# Patient Record
Sex: MALE | Race: WHITE | Employment: FULL TIME | URBAN - METROPOLITAN AREA
[De-identification: names, ages, dates, MRNs, and addresses within clinical notes are randomized per-mention and may not be internally consistent; named-entity substitution may affect disease eponyms.]

---

## 2022-03-31 PROBLEM — M47.816 LUMBAR SPONDYLOSIS: Status: ACTIVE | Noted: 2022-03-31

## 2022-03-31 PROBLEM — M54.50 LUMBAR BACK PAIN: Status: ACTIVE | Noted: 2022-03-31

## 2022-07-01 RX ORDER — DICLOFENAC SODIUM 75 MG/1
75 TABLET, DELAYED RELEASE ORAL 2 TIMES DAILY WITH MEALS
Qty: 60 TABLET | Refills: 1 | Status: SHIPPED | OUTPATIENT
Start: 2022-07-01

## 2022-07-21 ENCOUNTER — OFFICE VISIT (OUTPATIENT)
Dept: ORTHOPEDIC SURGERY | Age: 52
End: 2022-07-21
Payer: COMMERCIAL

## 2022-07-21 DIAGNOSIS — M54.17 LUMBOSACRAL RADICULOPATHY: Primary | ICD-10-CM

## 2022-07-21 DIAGNOSIS — M54.6 THORACIC SPINE PAIN: ICD-10-CM

## 2022-07-21 PROCEDURE — 20552 NJX 1/MLT TRIGGER POINT 1/2: CPT | Performed by: PHYSICAL MEDICINE & REHABILITATION

## 2022-07-21 PROCEDURE — 64483 NJX AA&/STRD TFRM EPI L/S 1: CPT | Performed by: PHYSICAL MEDICINE & REHABILITATION

## 2022-07-21 RX ORDER — TRIAMCINOLONE ACETONIDE 40 MG/ML
280 INJECTION, SUSPENSION INTRA-ARTICULAR; INTRAMUSCULAR ONCE
Status: COMPLETED | OUTPATIENT
Start: 2022-07-21 | End: 2022-07-21

## 2022-07-21 RX ADMIN — TRIAMCINOLONE ACETONIDE 280 MG: 40 INJECTION, SUSPENSION INTRA-ARTICULAR; INTRAMUSCULAR at 16:11

## 2022-07-21 NOTE — PROGRESS NOTES
Date: 07/21/22   Name: Hayde Perales    Pre-Procedural Diagnosis:    Diagnosis Orders   1. Lumbosacral radiculopathy  FL NERVE BLOCK LUMBOSACRAL 1ST    FL NERVE BLOCK LUMBOSACRAL EACH ADD    triamcinolone acetonide (KENALOG-40) injection 280 mg      2. Thoracic spine pain  INJECT TRIGGER POINT, 1 OR 2    triamcinolone acetonide (KENALOG-40) injection 280 mg          Procedure: Selective Nerve Root Blocks (Transforaminal) - Multiple Level with trigger point injections    Precautions: [facility] Precautions spine injections: None. Patient denies any prior sensitivity to steroid, local anesthetic, contrast dye, iodine or shellfish. The procedure was discussed at length with the patient and informed consent was signed. The patient was placed in a prone position on the fluoroscopy table and the skin was prepped and draped in a routine sterile fashion. The areas to be injected were each anesthetized with approximately 5 cc of 1% Lidocaine. A 22-gauge 5 inch inch spinal needle was carefully advanced under fluoroscopic guidance to the left S1 transforaminal space and subsequently the right L5 transforaminal space. At this time 0.25 cc of omnipaque administered. . Once proper placement was confirmed, 2 cc of 0.25% Marcaine and 80 mg of Kenalog were injected through the spinal needle at each site. After informed oral consent, patient was prepped per routine. The bilateral upper thoracic paraspinal area(s) were injected. 60 mg of Kenalog with 1 cc of 0.25% Marcaine injected at each site. Patient tolerated well. Band aid(s) applied. Fluoroscopic guidance was used intermittently over a 10-minute period to insure proper needle placement and patient safety. A hard copy of the fluoroscopic  images has been placed in the patient's chart. The patient was monitored after the procedure and discharged home in stable fashion.      Resume Meds:   N/A    Sherine Vigil MD  07/21/22

## 2022-08-10 RX ORDER — CYCLOBENZAPRINE HCL 10 MG
TABLET ORAL
Qty: 90 TABLET | OUTPATIENT
Start: 2022-08-10

## 2022-08-30 RX ORDER — DICLOFENAC SODIUM 75 MG/1
TABLET, DELAYED RELEASE ORAL
Qty: 60 TABLET | Refills: 1 | OUTPATIENT
Start: 2022-08-30

## 2022-10-03 ENCOUNTER — OFFICE VISIT (OUTPATIENT)
Dept: ORTHOPEDIC SURGERY | Age: 52
End: 2022-10-03
Payer: COMMERCIAL

## 2022-10-03 DIAGNOSIS — M54.17 LUMBOSACRAL RADICULOPATHY: Primary | ICD-10-CM

## 2022-10-03 PROCEDURE — 99213 OFFICE O/P EST LOW 20 MIN: CPT | Performed by: PHYSICAL MEDICINE & REHABILITATION

## 2022-10-03 RX ORDER — GABAPENTIN 100 MG/1
100 CAPSULE ORAL 3 TIMES DAILY
Qty: 270 CAPSULE | Refills: 3 | Status: SHIPPED | OUTPATIENT
Start: 2022-10-03 | End: 2023-09-28

## 2022-10-03 RX ORDER — BUPRENORPHINE 15 UG/H
1 PATCH TRANSDERMAL WEEKLY
Qty: 4 PATCH | Refills: 5 | Status: SHIPPED | OUTPATIENT
Start: 2022-10-03 | End: 2023-03-20

## 2022-10-03 RX ORDER — DICLOFENAC SODIUM AND MISOPROSTOL 75; 200 MG/1; UG/1
1 TABLET, DELAYED RELEASE ORAL 2 TIMES DAILY
Qty: 180 TABLET | Refills: 1 | Status: SHIPPED | OUTPATIENT
Start: 2022-10-03

## 2022-10-03 NOTE — PROGRESS NOTES
Date:  10/03/22     HPI:  I am seeing Sary Arevalor in evalution/folowup. He has pain in the lumbar spine that may gravitate down the posterior aspect of the left leg and the lateral aspect of the right leg. Left S1 and right L5 selective nerve root blocks have offered benefit. He typically goes 3-4 months. The last set of injections of this type were about 2-1/2 months ago. He started work with football coaching and tells me that that typically lowers the effectiveness duration a little bit. At one time he was having severe pain across the lower lumbar paraspinal areas and responded to facet joint injections, though that area is doing pretty well right now. He has pain in the upper thoracic paraspinal areas and typically gets injections there but does not need those right now. These were last performed in late July. He has had spine surgical intervention in the past by Dr. Seamus Ward. Currently is not felt that he needs a repeat spine surgical intervention. We will try to get by with minimally invasive therapies. He also takes Butrans patches as well as diclofenac, cyclobenzaprine and gabapentin. The latter medication in the 100 mg 3 times daily is offering some benefit and no side effects    ROS: No new problems since last being seen    PE: Cooperative. Good strength in lower extremities. No lateralizing sensory findings. He ambulates without assistance. He has tenderness in the lower lumbar paraspinal areas and buttocks. I did not examine his thoracic spine today. Assessment/Plan:    1. PREDOMINANTLY MUSCULOSKELETAL LUMBOSACRAL  SPINE PAIN. Has done reasonably well. We will try and avoid repeat spine surgical options. He does well with selective nerve root blocks at the appropriate levels. We will repeat those injections at first availability. We do not need to reimage. Will give refills on the medications that he needs today, this to include Butrans patches.   I will see him back 6 months for continuity of care. We will have him come in for repeat selective nerve root blocks at first availability. 2.  Thoracic spine pain. Repeat trigger point injections when required, does not sound like he needs them today. No indication for imaging.     Michel Dyer MD

## 2022-12-19 DIAGNOSIS — M54.17 LUMBOSACRAL RADICULOPATHY: ICD-10-CM

## 2022-12-27 RX ORDER — GABAPENTIN 100 MG/1
100 CAPSULE ORAL 3 TIMES DAILY
Qty: 270 CAPSULE | Refills: 3 | Status: SHIPPED | OUTPATIENT
Start: 2022-12-27 | End: 2023-12-22

## 2023-02-16 ENCOUNTER — OFFICE VISIT (OUTPATIENT)
Dept: ORTHOPEDIC SURGERY | Age: 53
End: 2023-02-16

## 2023-02-16 DIAGNOSIS — M54.17 LUMBOSACRAL RADICULOPATHY: Primary | ICD-10-CM

## 2023-02-16 DIAGNOSIS — M54.6 THORACIC SPINE PAIN: ICD-10-CM

## 2023-02-16 DIAGNOSIS — M47.816 SPONDYLOSIS WITHOUT MYELOPATHY OR RADICULOPATHY, LUMBAR REGION: ICD-10-CM

## 2023-02-16 DIAGNOSIS — M54.2 CERVICALGIA: ICD-10-CM

## 2023-02-16 RX ORDER — TRIAMCINOLONE ACETONIDE 40 MG/ML
280 INJECTION, SUSPENSION INTRA-ARTICULAR; INTRAMUSCULAR ONCE
Status: COMPLETED | OUTPATIENT
Start: 2023-02-16 | End: 2023-02-16

## 2023-02-16 RX ADMIN — TRIAMCINOLONE ACETONIDE 280 MG: 40 INJECTION, SUSPENSION INTRA-ARTICULAR; INTRAMUSCULAR at 16:19

## 2023-02-16 NOTE — PROGRESS NOTES
Date: 02/16/23   Name: Qiana Mota    Pre-Procedural Diagnosis:    Diagnosis Orders   1. Lumbosacral radiculopathy  FL NERVE BLOCK LUMBOSACRAL 1ST    FL NERVE BLOCK LUMBOSACRAL EACH ADD    triamcinolone acetonide (KENALOG-40) injection 280 mg      2. Spondylosis without myelopathy or radiculopathy, lumbar region  triamcinolone acetonide (KENALOG-40) injection 280 mg      3. Thoracic spine pain  triamcinolone acetonide (KENALOG-40) injection 280 mg    INJECT TRIGGER POINT, 1 OR 2      4. Cervicalgia            Procedure: Selective Nerve Root Blocks (Transforaminal) - Multiple Level with trigger point injections    Precautions: [facility] Precautions spine injections: None. Patient denies any prior sensitivity to steroid, local anesthetic, contrast dye, iodine or shellfish. The procedure was discussed at length with the patient and informed consent was signed. The patient was placed in a prone position on the fluoroscopy table and the skin was prepped and draped in a routine sterile fashion. The areas to be injected were each anesthetized with approximately 5 cc of 1% Lidocaine. A 22-gauge 5 inch inch spinal needle was carefully advanced under fluoroscopic guidance to the left S1 transforaminal space and subsequently the right L5 transforaminal space. At this time 0.25 cc of omnipaque administered. . Once proper placement was confirmed, 2 cc of 0.25% Marcaine and 80 mg of Kenalog were injected through the spinal needle at each site. After informed oral consent, patient was prepped per routine. The bilateral upper thoracic paraspinal area(s) were injected. 60 mg of Kenalog with 1 cc of 0.25% Marcaine injected at each site. Patient tolerated well. Band aid(s) applied. Fluoroscopic guidance was used intermittently over a 10-minute period to insure proper needle placement and patient safety. A hard copy of the fluoroscopic  images has been placed in the patient's chart.  The patient was monitored after the procedure and discharged home in stable fashion. A total of two muscles/sites injected today for trigger point charge.     Resume Meds:   N/A    Maurilio Mcmillan MD  02/16/23

## 2023-03-25 DIAGNOSIS — M54.17 LUMBOSACRAL RADICULOPATHY: ICD-10-CM

## 2023-03-27 RX ORDER — DICLOFENAC SODIUM AND MISOPROSTOL 75; 200 MG/1; UG/1
TABLET, DELAYED RELEASE ORAL
Qty: 180 TABLET | Refills: 1 | OUTPATIENT
Start: 2023-03-27

## 2023-05-15 DIAGNOSIS — M54.17 LUMBOSACRAL RADICULOPATHY: Primary | ICD-10-CM

## 2023-05-16 RX ORDER — DICLOFENAC SODIUM AND MISOPROSTOL 75; 200 MG/1; UG/1
1 TABLET, DELAYED RELEASE ORAL 2 TIMES DAILY
Qty: 60 TABLET | Refills: 0 | Status: SHIPPED | OUTPATIENT
Start: 2023-05-16

## 2023-05-21 DIAGNOSIS — M54.17 LUMBOSACRAL RADICULOPATHY: ICD-10-CM

## 2023-05-22 ENCOUNTER — OFFICE VISIT (OUTPATIENT)
Dept: ORTHOPEDIC SURGERY | Age: 53
End: 2023-05-22
Payer: COMMERCIAL

## 2023-05-22 DIAGNOSIS — M54.17 LUMBOSACRAL RADICULOPATHY: Primary | ICD-10-CM

## 2023-05-22 PROCEDURE — 99213 OFFICE O/P EST LOW 20 MIN: CPT | Performed by: PHYSICAL MEDICINE & REHABILITATION

## 2023-05-22 RX ORDER — BUPRENORPHINE 15 UG/H
PATCH TRANSDERMAL
Qty: 4 PATCH | OUTPATIENT
Start: 2023-05-22

## 2023-05-22 RX ORDER — BUPRENORPHINE 15 UG/H
1 PATCH TRANSDERMAL WEEKLY
Qty: 4 PATCH | Refills: 5 | Status: SHIPPED | OUTPATIENT
Start: 2023-05-22 | End: 2023-06-21

## 2023-05-22 RX ORDER — GABAPENTIN 100 MG/1
100 CAPSULE ORAL 3 TIMES DAILY
Qty: 270 CAPSULE | Refills: 1 | Status: SHIPPED | OUTPATIENT
Start: 2023-05-22 | End: 2023-11-18

## 2023-05-22 RX ORDER — BUPRENORPHINE 15 UG/H
1 PATCH TRANSDERMAL WEEKLY
COMMUNITY
End: 2023-05-22 | Stop reason: SDUPTHER

## 2023-05-22 NOTE — PROGRESS NOTES
Date:  05/22/23     HPI:  I am seeing Sue Santana in evalution/folowup. I last saw him in the office setting in October. He has pain that may gravitate down the posterior aspect of the left leg in the lateral aspect of the right leg. Left S1 and right L5 selective nerve root blocks have offered benefit, these most recently performed about 3 months ago. The pain has come back in a similar fashion he is desiring reinjection. He also has pain across the lower lumbar paraspinal areas that have responded to facet joint injections, though that is doing pretty well right now. He also has pain in the upper thoracic paraspinal areas and gets trigger point injections there, typically in conjunction with the spine injections. He takes Butrans patches with good tolerability. He needs refills on those. He also takes diclofenac/misoprostol and gabapentin. The latter is 100 mg 3 times a day and he feels that there is benefit, has noted that if he misses the medicine or comes off of it he feels worse. He does not want to take a higher dose. He has had prior spine surgical intervention by Dr. Francesca Jackson. Repeat surgery is not required at this time. ROS: In the interim he developed a case of severe food poisoning with colitis but is over that. PE: Alert and cooperative. Good strength in the lower extremities. No lateralizing findings. He has tenderness in lower lumbar paraspinal areas and upper thoracic paraspinal areas. Ambulates without assistance    Assessment/Plan:    1. PREDOMINANTLY MUSCULOSKELETAL LUMBOSACRAL  SPINE PAIN. Bilateral radicular symptoms as per narrative above. We do not need to reimage. I do not think there is a repeat spine surgical issue at this time. We will reorder injections at first availability. Refills on medications. 2.  Upper thoracic paraspinal pain. Repeat trigger point injections.     Hua Landeros MD

## 2023-05-24 ENCOUNTER — OFFICE VISIT (OUTPATIENT)
Dept: ORTHOPEDIC SURGERY | Age: 53
End: 2023-05-24

## 2023-05-24 DIAGNOSIS — M54.6 THORACIC SPINE PAIN: ICD-10-CM

## 2023-05-24 DIAGNOSIS — M54.17 LUMBOSACRAL RADICULOPATHY: Primary | ICD-10-CM

## 2023-05-24 RX ORDER — TRIAMCINOLONE ACETONIDE 40 MG/ML
280 INJECTION, SUSPENSION INTRA-ARTICULAR; INTRAMUSCULAR ONCE
Status: COMPLETED | OUTPATIENT
Start: 2023-05-24 | End: 2023-05-24

## 2023-05-24 RX ADMIN — TRIAMCINOLONE ACETONIDE 280 MG: 40 INJECTION, SUSPENSION INTRA-ARTICULAR; INTRAMUSCULAR at 16:36

## 2023-05-24 NOTE — PROGRESS NOTES
Date: 05/24/23   Name: Jono Guzman    Pre-Procedural Diagnosis:    Diagnosis Orders   1. Lumbosacral radiculopathy  FL NERVE BLOCK LUMBOSACRAL 1ST    FL NERVE BLOCK LUMBOSACRAL EACH ADD      2. Thoracic spine pain  INJECT TRIGGER POINT, 1 OR 2          Procedure: Selective Nerve Root Blocks (Transforaminal) - Single Level with trigger point injections    Precautions: [facility] Precautions spine injections: None. Patient denies any prior sensitivity to steroid, local anesthetic, contrast dye, iodine or shellfish. The procedure was discussed at length with the patient and informed consent was signed. The patient was placed in a prone position on the fluoroscopy table and the skin was prepped and draped in a routine sterile fashion. The areas to be injected was/were anesthetized with approximately 5 cc of 1% Lidocaine. A 22-gauge 5 inch inch spinal needle was carefully advanced under fluoroscopic guidance to the left S1 and right L5 transforaminal space(s). At this time 0.25 cc of omnipaque administered. Once proper placement was confirmed, 2 cc of 0.25% Marcaine and 80 mg of Kenalog were injected through the spinal needle at that/each site. After informed oral consent, patient was prepped per routine. The bilateral upper thoracic paraspinal area(s) were injected. 60 mg of Kenalog with 1 cc of 0.25% Marcaine injected at each site. Patient tolerated well. Band aid(s) applied. Fluoroscopic guidance was used intermittently over a 10-minute period to insure proper needle placement and patient safety. A hard copy of the fluoroscopic  images has been placed in the patient's chart. The patient was monitored after the procedure and discharged home in stable fashion. A total of two muscles/sites injected today for trigger point charge. A total of 7 cc of Kenalog were administered during this procedure.     Resume Meds:  N/A    Joel Huerta MD  05/24/23

## 2023-06-25 DIAGNOSIS — M54.17 LUMBOSACRAL RADICULOPATHY: ICD-10-CM

## 2023-06-26 RX ORDER — DICLOFENAC SODIUM AND MISOPROSTOL 75; 200 MG/1; UG/1
1 TABLET, DELAYED RELEASE ORAL 2 TIMES DAILY
Qty: 60 TABLET | Refills: 0 | Status: SHIPPED | OUTPATIENT
Start: 2023-06-26

## 2023-07-24 DIAGNOSIS — M54.17 LUMBOSACRAL RADICULOPATHY: ICD-10-CM

## 2023-07-24 RX ORDER — DICLOFENAC SODIUM AND MISOPROSTOL 75; 200 MG/1; UG/1
TABLET, DELAYED RELEASE ORAL
Qty: 60 TABLET | Refills: 0 | OUTPATIENT
Start: 2023-07-24

## 2023-07-24 RX ORDER — DICLOFENAC SODIUM AND MISOPROSTOL 75; 200 MG/1; UG/1
1 TABLET, DELAYED RELEASE ORAL 2 TIMES DAILY
Qty: 60 TABLET | Refills: 3 | Status: SHIPPED | OUTPATIENT
Start: 2023-07-24

## 2023-08-30 ENCOUNTER — OFFICE VISIT (OUTPATIENT)
Dept: ORTHOPEDIC SURGERY | Age: 53
End: 2023-08-30
Payer: COMMERCIAL

## 2023-08-30 DIAGNOSIS — M60.9 MYOFASCIITIS: ICD-10-CM

## 2023-08-30 DIAGNOSIS — M54.6 THORACIC SPINE PAIN: ICD-10-CM

## 2023-08-30 DIAGNOSIS — M54.17 LUMBOSACRAL RADICULOPATHY: Primary | ICD-10-CM

## 2023-08-30 PROCEDURE — 64483 NJX AA&/STRD TFRM EPI L/S 1: CPT | Performed by: PHYSICAL MEDICINE & REHABILITATION

## 2023-08-30 PROCEDURE — 20552 NJX 1/MLT TRIGGER POINT 1/2: CPT | Performed by: PHYSICAL MEDICINE & REHABILITATION

## 2023-08-30 PROCEDURE — 64484 NJX AA&/STRD TFRM EPI L/S EA: CPT | Performed by: PHYSICAL MEDICINE & REHABILITATION

## 2023-08-30 RX ORDER — TRIAMCINOLONE ACETONIDE 40 MG/ML
280 INJECTION, SUSPENSION INTRA-ARTICULAR; INTRAMUSCULAR ONCE
Status: COMPLETED | OUTPATIENT
Start: 2023-08-30 | End: 2023-08-30

## 2023-08-30 RX ADMIN — TRIAMCINOLONE ACETONIDE 280 MG: 40 INJECTION, SUSPENSION INTRA-ARTICULAR; INTRAMUSCULAR at 17:15

## 2023-08-30 NOTE — PROGRESS NOTES
Date: 08/30/23   Name: Henna Martell    Pre-Procedural Diagnosis:    Diagnosis Orders   1. Lumbosacral radiculopathy  FL NERVE BLOCK LUMBOSACRAL 1ST    triamcinolone acetonide (KENALOG-40) injection 280 mg      2. Thoracic spine pain  INJECT TRIGGER POINT, 1 OR 2    triamcinolone acetonide (KENALOG-40) injection 280 mg      3. Myofasciitis            Procedure: Selective Nerve Root Blocks (Transforaminal) - Multiple Level with trigger point injections    Precautions: [facility] Precautions spine injections: None. Patient denies any prior sensitivity to steroid, local anesthetic, contrast dye, iodine or shellfish. The procedure was discussed at length with the patient and informed consent was signed. The patient was placed in a prone position on the fluoroscopy table and the skin was prepped and draped in a routine sterile fashion. The areas to be injected were each anesthetized with approximately 5 cc of 1% Lidocaine. A 22-gauge 5 inch spinal needle was carefully advanced under fluoroscopic guidance to the left S1 transforaminal space and subsequently the right L5 transforaminal space. At this time 0.25 cc of omnipaque administered. . Once proper placement was confirmed, 2 cc of 0.25% Marcaine and 80 mg of Kenalog were injected through the spinal needle at each site. After informed oral consent, patient was prepped per routine. The bilateral thoracic paraspinal area(s) were injected. 60 mg of Kenalog with 1 cc of 0.25% Marcaine injected at each site. Patient tolerated well. Band aid(s) applied. Fluoroscopic guidance was used intermittently over a 10-minute period to insure proper needle placement and patient safety. A hard copy of the fluoroscopic  images has been placed in the patient's chart. The patient was monitored after the procedure and discharged home in stable fashion. A total of two muscles/sites injected today for trigger point charge.     A total of 7 cc of Kenalog were administered during

## 2023-09-07 ENCOUNTER — TELEPHONE (OUTPATIENT)
Dept: ORTHOPEDIC SURGERY | Age: 53
End: 2023-09-07

## 2023-09-07 ENCOUNTER — OFFICE VISIT (OUTPATIENT)
Dept: ORTHOPEDIC SURGERY | Age: 53
End: 2023-09-07

## 2023-09-07 DIAGNOSIS — M60.9 MYOFASCIITIS: ICD-10-CM

## 2023-09-07 DIAGNOSIS — M54.17 LUMBOSACRAL RADICULOPATHY: Primary | ICD-10-CM

## 2023-09-07 DIAGNOSIS — M47.816 SPONDYLOSIS WITHOUT MYELOPATHY OR RADICULOPATHY, LUMBAR REGION: ICD-10-CM

## 2023-09-07 RX ORDER — METHYLPREDNISOLONE ACETATE 40 MG/ML
80 INJECTION, SUSPENSION INTRA-ARTICULAR; INTRALESIONAL; INTRAMUSCULAR; SOFT TISSUE ONCE
Status: COMPLETED | OUTPATIENT
Start: 2023-09-07 | End: 2023-09-07

## 2023-09-07 RX ORDER — METHYLPREDNISOLONE 4 MG/1
TABLET ORAL
Qty: 1 KIT | Refills: 0 | Status: SHIPPED | OUTPATIENT
Start: 2023-09-07

## 2023-09-07 RX ADMIN — METHYLPREDNISOLONE ACETATE 80 MG: 40 INJECTION, SUSPENSION INTRA-ARTICULAR; INTRALESIONAL; INTRAMUSCULAR; SOFT TISSUE at 09:07

## 2023-09-07 NOTE — PROGRESS NOTES
injections within 2 weeks of the last injection if we need to. Trigger Point Injection(s):  After informed oral consent, patient was prepped per routine. The left lumbar paraspinal area(s) was injected. 80 mg of DepoMedrol with 1 cc of 0.25% Marcaine injected at that site. Patient tolerated well. Band aid(s) applied. A total of ONE muscles/sites injected today for trigger point charge. A total of 2 cc of DepoMedrol were administered during this procedure.     Laura Teixeira MD

## 2023-09-19 ENCOUNTER — TELEPHONE (OUTPATIENT)
Dept: ORTHOPEDIC SURGERY | Age: 53
End: 2023-09-19

## 2023-09-19 NOTE — TELEPHONE ENCOUNTER
Reviewed MRI lumbar spine results with patient. He is 2 weeks into the exacerbation of symptoms to include pain and numbness down the back of the left leg. This is typical for him but has responded to spine injections in the past.  Unfortunately the most recent injections from a few weeks ago did not either help or protect him from this event. This MRI does reveal persistent degenerative change, lateral recess stenosis that could affect the left S1 nerve root; however, the radiologisr did not think there is any clear anatomic progression. Does not appear there is been a new event such as acute herniation, etc.  He has gone through 3 sets of injections this year and if we repeat another set of injections he would have to wait a while to get the fifth. Since he is having some slow improvement we decided to have him follow along a little bit longer and withhold the next injection as best able.

## 2023-11-15 DIAGNOSIS — M54.17 LUMBOSACRAL RADICULOPATHY: ICD-10-CM

## 2023-11-16 RX ORDER — DICLOFENAC SODIUM AND MISOPROSTOL 75; 200 MG/1; UG/1
1 TABLET, DELAYED RELEASE ORAL 2 TIMES DAILY
Qty: 60 TABLET | Refills: 3 | Status: SHIPPED | OUTPATIENT
Start: 2023-11-16

## 2023-11-27 ENCOUNTER — TELEPHONE (OUTPATIENT)
Dept: ORTHOPEDIC SURGERY | Age: 53
End: 2023-11-27

## 2023-11-30 ENCOUNTER — OFFICE VISIT (OUTPATIENT)
Dept: ORTHOPEDIC SURGERY | Age: 53
End: 2023-11-30
Payer: COMMERCIAL

## 2023-11-30 DIAGNOSIS — M54.2 CERVICALGIA: ICD-10-CM

## 2023-11-30 DIAGNOSIS — M54.17 LUMBOSACRAL RADICULOPATHY: Primary | ICD-10-CM

## 2023-11-30 DIAGNOSIS — M60.9 MYOSITIS, UNSPECIFIED MYOSITIS TYPE, UNSPECIFIED SITE: ICD-10-CM

## 2023-11-30 PROCEDURE — 64484 NJX AA&/STRD TFRM EPI L/S EA: CPT | Performed by: PHYSICAL MEDICINE & REHABILITATION

## 2023-11-30 PROCEDURE — 20552 NJX 1/MLT TRIGGER POINT 1/2: CPT | Performed by: PHYSICAL MEDICINE & REHABILITATION

## 2023-11-30 PROCEDURE — 64483 NJX AA&/STRD TFRM EPI L/S 1: CPT | Performed by: PHYSICAL MEDICINE & REHABILITATION

## 2023-11-30 RX ORDER — TRIAMCINOLONE ACETONIDE 40 MG/ML
280 INJECTION, SUSPENSION INTRA-ARTICULAR; INTRAMUSCULAR ONCE
Status: COMPLETED | OUTPATIENT
Start: 2023-11-30 | End: 2023-11-30

## 2023-11-30 RX ADMIN — TRIAMCINOLONE ACETONIDE 280 MG: 40 INJECTION, SUSPENSION INTRA-ARTICULAR; INTRAMUSCULAR at 15:32

## 2023-11-30 NOTE — PROGRESS NOTES
Date: 11/30/23   Name: Ingrid Carrillo    Pre-Procedural Diagnosis:    Diagnosis Orders   1. Lumbosacral radiculopathy  FL NERVE BLOCK LUMBOSACRAL 1ST    FL NERVE BLOCK LUMBOSACRAL EACH ADD      2. Cervicalgia  INJECT TRIGGER POINT, 1 OR 2      3. Myositis, unspecified myositis type, unspecified site            Procedure: Selective Nerve Root Blocks (Transforaminal) - Multiple Level with trigger point injections    Precautions: [facility] Precautions spine injections: None. Patient denies any prior sensitivity to steroid, local anesthetic, contrast dye, iodine or shellfish. The procedure was discussed at length with the patient and informed consent was signed. The patient was placed in a prone position on the fluoroscopy table and the skin was prepped and draped in a routine sterile fashion. The areas to be injected were each anesthetized with approximately 5 cc of 1% Lidocaine. A 22-gauge 5 inch spinal needle was carefully advanced under fluoroscopic guidance to the left S1 transforaminal space and subsequently the right L5 transforaminal space. At this time 0.25 cc of omnipaque administered. . Once proper placement was confirmed, 2 cc of 0.25% Marcaine and 80 mg of Kenalog were injected through the spinal needle at each site. After informed oral consent, patient was prepped per routine. The bilateral lower cervical paraspinal area(s) were injected. 60 mg of Kenalog with 1 cc of 0.25% Marcaine injected at each site. Patient tolerated well. Band aid(s) applied. Fluoroscopic guidance was used intermittently over a 10-minute period to insure proper needle placement and patient safety. A hard copy of the fluoroscopic  images has been placed in the patient's chart. The patient was monitored after the procedure and discharged home in stable fashion. A total of two muscles/sites injected today for trigger point charge. A total of 2 cc of Kenalog were administered during this procedure.     Resume Meds:

## 2024-01-02 DIAGNOSIS — M54.17 LUMBOSACRAL RADICULOPATHY: Primary | ICD-10-CM

## 2024-01-02 DIAGNOSIS — M54.2 CERVICALGIA: ICD-10-CM

## 2024-01-02 RX ORDER — BUPRENORPHINE 15 UG/H
1 PATCH TRANSDERMAL WEEKLY
Qty: 4 PATCH | Refills: 5 | Status: SHIPPED | OUTPATIENT
Start: 2024-01-02 | End: 2024-06-18

## 2024-01-18 DIAGNOSIS — M54.17 LUMBOSACRAL RADICULOPATHY: ICD-10-CM

## 2024-01-18 RX ORDER — GABAPENTIN 100 MG/1
100 CAPSULE ORAL 3 TIMES DAILY
Qty: 270 CAPSULE | Refills: 3 | OUTPATIENT
Start: 2024-01-18

## 2024-04-25 ENCOUNTER — OFFICE VISIT (OUTPATIENT)
Dept: ORTHOPEDIC SURGERY | Age: 54
End: 2024-04-25

## 2024-04-25 DIAGNOSIS — M54.16 LUMBAR RADICULOPATHY: Primary | ICD-10-CM

## 2024-04-25 RX ORDER — TRIAMCINOLONE ACETONIDE 40 MG/ML
280 INJECTION, SUSPENSION INTRA-ARTICULAR; INTRAMUSCULAR ONCE
Status: COMPLETED | OUTPATIENT
Start: 2024-04-25 | End: 2024-04-25

## 2024-04-25 RX ADMIN — TRIAMCINOLONE ACETONIDE 280 MG: 40 INJECTION, SUSPENSION INTRA-ARTICULAR; INTRAMUSCULAR at 15:27

## 2024-04-25 NOTE — PROGRESS NOTES
Date: 04/25/24   Name: Jerrod Maldonado    Pre-Procedural Diagnosis:    Diagnosis Orders   1. Lumbar radiculopathy  FL NERVE BLOCK LUMBOSACRAL 1ST    FL NERVE BLOCK LUMBOSACRAL EACH ADD    INJECT TRIGGER POINT, 1 OR 2    triamcinolone acetonide (KENALOG-40) injection 280 mg          Procedure: Selective Nerve Root Blocks (Transforaminal) - Multiple Level with trigger point injections    Precautions: [facility] Precautions spine injections: None.  Patient denies any prior sensitivity to steroid, local anesthetic, contrast dye, iodine or shellfish.    The procedure was discussed at length with the patient and informed consent was signed. The patient was placed in a prone position on the fluoroscopy table and the skin was prepped and draped in a routine sterile fashion. The areas to be injected were each anesthetized with approximately 5 cc of 1% Lidocaine. A 22-gauge 5 inch spinal needle was carefully advanced under fluoroscopic guidance to the left S1 transforaminal space and subsequently the right L5 transforaminal space.At this time 0.25 cc of omnipaque administered.. Once proper placement was confirmed, 2 cc of 0.25% Marcaine and 80 mg of Kenalog were injected through the spinal needle at each site.     After informed oral consent, patient was prepped per routine. The bilateral lower cervical paraspinal areas were injected. 60 mg of Kenalog with 1 cc of 0.25% Marcaine injected at each site. Patient tolerated well. Band aids applied.     Fluoroscopic guidance was used intermittently over a 10-minute period to insure proper needle placement and patient safety. A hard copy of the fluoroscopic  images has been placed in the patient's chart. The patient was monitored after the procedure and discharged home in stable fashion.     A total of two muscles/sites injected today for trigger point charge.    A total of 7 cc of Kenalog were administered during this procedure.    Resume Meds:   N/A    L JUDY MAJANO JR,

## 2024-07-12 ENCOUNTER — PATIENT MESSAGE (OUTPATIENT)
Dept: ORTHOPEDIC SURGERY | Age: 54
End: 2024-07-12

## 2024-07-12 DIAGNOSIS — M54.16 LUMBAR RADICULOPATHY: Primary | ICD-10-CM

## 2024-07-12 DIAGNOSIS — M47.816 SPONDYLOSIS WITHOUT MYELOPATHY OR RADICULOPATHY, LUMBAR REGION: ICD-10-CM

## 2024-07-16 ENCOUNTER — OFFICE VISIT (OUTPATIENT)
Dept: ORTHOPEDIC SURGERY | Age: 54
End: 2024-07-16
Payer: COMMERCIAL

## 2024-07-16 DIAGNOSIS — M54.16 LUMBAR RADICULOPATHY: ICD-10-CM

## 2024-07-16 DIAGNOSIS — M47.816 SPONDYLOSIS WITHOUT MYELOPATHY OR RADICULOPATHY, LUMBAR REGION: ICD-10-CM

## 2024-07-16 DIAGNOSIS — M54.2 CERVICALGIA: Primary | ICD-10-CM

## 2024-07-16 PROCEDURE — 20552 NJX 1/MLT TRIGGER POINT 1/2: CPT | Performed by: PHYSICAL MEDICINE & REHABILITATION

## 2024-07-16 PROCEDURE — 64484 NJX AA&/STRD TFRM EPI L/S EA: CPT | Performed by: PHYSICAL MEDICINE & REHABILITATION

## 2024-07-16 PROCEDURE — 64483 NJX AA&/STRD TFRM EPI L/S 1: CPT | Performed by: PHYSICAL MEDICINE & REHABILITATION

## 2024-07-16 RX ORDER — TRIAMCINOLONE ACETONIDE 40 MG/ML
280 INJECTION, SUSPENSION INTRA-ARTICULAR; INTRAMUSCULAR ONCE
Status: COMPLETED | OUTPATIENT
Start: 2024-07-16 | End: 2024-07-16

## 2024-07-16 RX ADMIN — TRIAMCINOLONE ACETONIDE 280 MG: 40 INJECTION, SUSPENSION INTRA-ARTICULAR; INTRAMUSCULAR at 10:16

## 2024-07-16 NOTE — PROGRESS NOTES
Date: 07/16/24   Name: Jerrod Maldonado    Pre-Procedural Diagnosis:    Diagnosis Orders   1. Cervicalgia  INJECT TRIGGER POINT, 1 OR 2    triamcinolone acetonide (KENALOG-40) injection 280 mg      2. Lumbar radiculopathy  FL NERVE BLOCK LUMBOSACRAL EACH ADD    FL NERVE BLOCK LUMBOSACRAL 1ST    triamcinolone acetonide (KENALOG-40) injection 280 mg      3. Spondylosis without myelopathy or radiculopathy, lumbar region        Verbally confirmed side of symptomatology.    I personally reviewed the informed consent for operation/procedure per [facility] protocol.  This involves risks and benefits of procedure, potential for success/improvement of injections.  Patient consented to appropriate local anesthesia, emergent blood transfusion, clarification of DNR status.  This form was signed by all appropriate parties and scanned into the medical record.    Note that is not appropriate for me to discuss spine surgical issues or other treatment options if I am not the primary clinician.  If I am the ordering clinician, those issues would have been discussed at the appropriate office visit.     Procedure: Selective Nerve Root Blocks (Transforaminal) - Multiple Level with trigger point injections    Precautions: [facility] Precautions spine injections: None.  Patient denies any prior sensitivity to steroid, local anesthetic, contrast dye, iodine or shellfish.    The procedure was discussed at length with the patient and informed consent was signed. The patient was placed in a prone position on the fluoroscopy table and the skin was prepped and draped in a routine sterile fashion. The areas to be injected were each anesthetized with approximately 5 cc of 1% Lidocaine. A 22-gauge 5 inch spinal needle was carefully advanced under fluoroscopic guidance to the left S1 transforaminal space and subsequently the right L5 transforaminal space.At this time 0.25 cc of omnipaque administered.. Once proper placement was confirmed, 2 cc of

## 2024-08-20 DIAGNOSIS — M54.17 LUMBOSACRAL RADICULOPATHY: Primary | ICD-10-CM

## 2024-08-20 RX ORDER — BUPRENORPHINE 15 UG/H
1 PATCH TRANSDERMAL WEEKLY
Qty: 4 PATCH | Refills: 0 | Status: SHIPPED | OUTPATIENT
Start: 2024-08-20 | End: 2024-09-19

## 2024-09-09 DIAGNOSIS — M54.17 LUMBOSACRAL RADICULOPATHY: Primary | ICD-10-CM

## 2024-09-10 ENCOUNTER — OFFICE VISIT (OUTPATIENT)
Dept: ORTHOPEDIC SURGERY | Age: 54
End: 2024-09-10
Payer: COMMERCIAL

## 2024-09-10 DIAGNOSIS — M54.2 CERVICALGIA: ICD-10-CM

## 2024-09-10 DIAGNOSIS — M79.10 MYALGIA: ICD-10-CM

## 2024-09-10 DIAGNOSIS — M54.17 LUMBOSACRAL RADICULOPATHY: Primary | ICD-10-CM

## 2024-09-10 DIAGNOSIS — M54.50 LUMBAR BACK PAIN: ICD-10-CM

## 2024-09-10 PROCEDURE — 20552 NJX 1/MLT TRIGGER POINT 1/2: CPT | Performed by: PHYSICAL MEDICINE & REHABILITATION

## 2024-09-10 PROCEDURE — 99213 OFFICE O/P EST LOW 20 MIN: CPT | Performed by: PHYSICAL MEDICINE & REHABILITATION

## 2024-09-10 RX ORDER — DICLOFENAC SODIUM AND MISOPROSTOL 75; 200 MG/1; UG/1
1 TABLET, DELAYED RELEASE ORAL 2 TIMES DAILY
Qty: 60 TABLET | Refills: 5 | Status: SHIPPED | OUTPATIENT
Start: 2024-09-10 | End: 2025-03-09

## 2024-09-10 RX ORDER — TRIAMCINOLONE ACETONIDE 40 MG/ML
120 INJECTION, SUSPENSION INTRA-ARTICULAR; INTRAMUSCULAR ONCE
Status: COMPLETED | OUTPATIENT
Start: 2024-09-10 | End: 2024-09-10

## 2024-09-10 RX ADMIN — TRIAMCINOLONE ACETONIDE 120 MG: 40 INJECTION, SUSPENSION INTRA-ARTICULAR; INTRAMUSCULAR at 10:06

## 2024-10-28 DIAGNOSIS — M54.50 LUMBAR BACK PAIN: Primary | ICD-10-CM

## 2024-10-28 RX ORDER — BUPRENORPHINE 15 UG/H
1 PATCH TRANSDERMAL WEEKLY
Qty: 4 PATCH | Refills: 0 | Status: SHIPPED | OUTPATIENT
Start: 2024-10-28 | End: 2024-11-27

## 2024-12-20 ENCOUNTER — OFFICE VISIT (OUTPATIENT)
Dept: ORTHOPEDIC SURGERY | Age: 54
End: 2024-12-20

## 2024-12-20 DIAGNOSIS — M54.2 CERVICALGIA: ICD-10-CM

## 2024-12-20 DIAGNOSIS — M54.17 LUMBOSACRAL RADICULOPATHY: Primary | ICD-10-CM

## 2024-12-20 DIAGNOSIS — M79.10 MYALGIA, UNSPECIFIED SITE: ICD-10-CM

## 2024-12-20 RX ORDER — TRIAMCINOLONE ACETONIDE 40 MG/ML
40 INJECTION, SUSPENSION INTRA-ARTICULAR; INTRAMUSCULAR ONCE
Status: COMPLETED | OUTPATIENT
Start: 2024-12-20 | End: 2024-12-20

## 2024-12-20 RX ADMIN — TRIAMCINOLONE ACETONIDE 40 MG: 40 INJECTION, SUSPENSION INTRA-ARTICULAR; INTRAMUSCULAR at 09:54

## 2024-12-20 NOTE — PROGRESS NOTES
Date: 12/20/24   Name: Jerrod Maldonado    Pre-Procedural Diagnosis:     Diagnosis Orders   1. Lumbosacral radiculopathy  INJECT TRIGGER POINT, 1 OR 2    FL NERVE BLOCK LUMBOSACRAL 1ST    FL NERVE BLOCK LUMBOSACRAL EACH ADD    triamcinolone acetonide (KENALOG-40) injection 40 mg      2. Cervicalgia  INJECT TRIGGER POINT, 1 OR 2    FL NERVE BLOCK LUMBOSACRAL 1ST    FL NERVE BLOCK LUMBOSACRAL EACH ADD    triamcinolone acetonide (KENALOG-40) injection 40 mg      3. Myalgia, unspecified site            Procedure: Selective Nerve Root Blocks (Transforaminal) - Multiple Level with lower cervical trigger point injections.    I have verbally confirmed side of symptomatology with patient. and I have personally reviewed with patient the informed consent for operation/procedure per [facility] protocol.  This involves risks and benefits of procedure, potential for success/improvement of injections,qualifications of physician performing procedure.  Consent form addressed appropriate local anesthesia, emergent blood transfusion, clarification of DNR status.  This form was signed by all appropriate parties and scanned into the medical record. Note that is not appropriate for me to discuss spine surgical issues or other treatment options if I am not the primary clinician.  If I am the ordering clinician, those issues would have been discussed at the appropriate office visit or at upcoming encounter.     Precautions: [facility] Precautions spine injections: None.  Patient denies any prior sensitivity to steroid, local anesthetic, contrast dye, iodine or shellfish.    The procedure was discussed at length with the patient and informed consent was signed. The patient was placed in a prone position on the fluoroscopy table and the skin was prepped and draped in a routine sterile fashion. The areas to be injected were each anesthetized with approximately 5 cc of 1% Lidocaine. A 22-gauge 5 inch inch spinal needle was carefully advanced

## 2025-01-03 DIAGNOSIS — M54.50 LUMBAR BACK PAIN: Primary | ICD-10-CM

## 2025-01-06 RX ORDER — BUPRENORPHINE 15 UG/H
1 PATCH TRANSDERMAL WEEKLY
Qty: 4 PATCH | Refills: 2 | Status: SHIPPED | OUTPATIENT
Start: 2025-01-06 | End: 2025-03-31

## 2025-01-28 DIAGNOSIS — M47.816 SPONDYLOSIS WITHOUT MYELOPATHY OR RADICULOPATHY, LUMBAR REGION: Primary | ICD-10-CM

## 2025-03-11 ENCOUNTER — OFFICE VISIT (OUTPATIENT)
Dept: ORTHOPEDIC SURGERY | Age: 55
End: 2025-03-11

## 2025-03-11 DIAGNOSIS — M54.2 CERVICALGIA: ICD-10-CM

## 2025-03-11 DIAGNOSIS — M54.17 LUMBOSACRAL RADICULOPATHY: ICD-10-CM

## 2025-03-11 DIAGNOSIS — M54.6 THORACIC SPINE PAIN: ICD-10-CM

## 2025-03-11 DIAGNOSIS — M54.50 LUMBAR BACK PAIN: Primary | ICD-10-CM

## 2025-03-11 DIAGNOSIS — M79.10 MYALGIA: ICD-10-CM

## 2025-03-11 RX ORDER — TRIAMCINOLONE ACETONIDE 40 MG/ML
40 INJECTION, SUSPENSION INTRA-ARTICULAR; INTRAMUSCULAR ONCE
Status: COMPLETED | OUTPATIENT
Start: 2025-03-11 | End: 2025-03-11

## 2025-03-11 RX ADMIN — TRIAMCINOLONE ACETONIDE 40 MG: 40 INJECTION, SUSPENSION INTRA-ARTICULAR; INTRAMUSCULAR at 15:17

## 2025-03-11 NOTE — PROGRESS NOTES
past.    He feels his legs are a little bit weaker, maybe little more so on the right.    ROS: As above    PE: Alert and cooperative.  Has pretty good strength bilaterally at the bedside.  Good range of motion of the hips.  No lateralizing findings.  No pathologically heightened reflexes.  She has tenderness in the lower lumbar paraspinal areas as well as the lower cervical paraspinal areas.    Assessment/Plan:     PREDOMINANTLY MUSCULOSKELETAL LUMBOSACRAL SPINE PAIN.  Has had radicular symptoms but now is having is much nonradiating axial pain.  Will offer trigger point injections into the affected areas had his modulates full symptomatology.  I can perform the selective nerve root blocks if we find a way to get them to go 3 months or longer.  Because of this and the fact he feels his legs look bit weaker I will repeat an MRI of the lumbar spine to be complete.  Will get back in touch with him once we have this information and see I does with trigger point injections today.  That time we determine if and when to perform further injections under fluoroscopy.  Continue Butrans patches otherwise see back 5 months for continuity of care.    2.    Cervical spine pain.  Axial pain for which he does well with trigger point injections.  Repeat today.  We do not need to image his cervical spine at this time.      Trigger Point Injection(s):  After informed oral consent, patient was prepped per routine. The bilateral lower cervical and lumbar paraspinal area(s) were injected. 60 mg of Kenalog with 1 cc of 0.25% Marcaine injected at each site. Patient tolerated well. Band aid(s) applied.      A total of four muscles/sites injected today for trigger point charge.    A total of 6 cc of Kenalog were administered during this procedure.    Clinical Notes:   I/my clinic will serve as the continuing focal point for all needed health care services and/or medical care services that are part of ongoing PAIN care related to patient's

## 2025-03-17 ENCOUNTER — TELEPHONE (OUTPATIENT)
Dept: ORTHOPEDIC SURGERY | Age: 55
End: 2025-03-17

## 2025-03-17 NOTE — TELEPHONE ENCOUNTER
MRI LUMBAR SPINE APPROVAL:        Status   Current Status: Approved   Validity Period: 3/17/2025 - 4/16/2025   Authorization: [id number] (Lumbar Spine MRI)   Patient   Name: SARMAD PANCHAL   Subscriber ID: [id number]   YOB: 1970   Gender: Male   Physician   Name: BLANCHE MAJANO JR   Provider ID: [id number]      Rendering Provider   Name: [facility]   Phone:    Address: [place] [address]   Fax: Not available   Rendering Provider ID: Not available   [email or web address] User   Name: [email or web address]   Company: [employer] [facility] [employer]   Username: [id number]   Job Title: MRI AUTHORIZATIONS   Email: [email or web address]   Address: [address] [place] [address]   Supervisor Name: Aspen Strange   Supervisor Email: [email or web address]   Details   Date of Service: 4/14/2025   Auto Accident: No   Pend/Reject Code: E8   Out of State: n/a   Release of Info Code: Y   Out of Country: n/a   Employment Related: No   Another Party: No   Level of Service: Not Urgent   Exams: Lumbar Spine MRI  - CPTs: 0698T, 79266, 20769, 56760   ICD10: M54.50  M54.17   Reason: M54.50 (ICD-10-CM) - Lumbar back pain M54.17 (ICD-10-CM) - Lumbosacral radiculopathy

## 2025-04-13 DIAGNOSIS — M54.17 LUMBOSACRAL RADICULOPATHY: ICD-10-CM

## 2025-04-13 DIAGNOSIS — M79.10 MYALGIA: ICD-10-CM

## 2025-04-13 DIAGNOSIS — M54.2 CERVICALGIA: ICD-10-CM

## 2025-04-14 DIAGNOSIS — M54.17 LUMBOSACRAL RADICULOPATHY: Primary | ICD-10-CM

## 2025-04-14 RX ORDER — DICLOFENAC SODIUM AND MISOPROSTOL 75; 200 MG/1; UG/1
1 TABLET, DELAYED RELEASE ORAL 2 TIMES DAILY
Qty: 60 TABLET | Refills: 5 | OUTPATIENT
Start: 2025-04-14 | End: 2025-10-11

## 2025-04-14 RX ORDER — DICLOFENAC SODIUM AND MISOPROSTOL 75; 200 MG/1; UG/1
1 TABLET, DELAYED RELEASE ORAL 2 TIMES DAILY
Qty: 60 TABLET | Refills: 5 | Status: SHIPPED | OUTPATIENT
Start: 2025-04-14 | End: 2025-10-11

## 2025-04-17 ENCOUNTER — TELEPHONE (OUTPATIENT)
Dept: ORTHOPEDIC SURGERY | Age: 55
End: 2025-04-17

## 2025-04-17 NOTE — TELEPHONE ENCOUNTER
I reviewed MRI lumbar spine results with patient.  Clinically has symptoms in the left S1 distribution as well as a right L5 distribution.  The last 2 sets of selective nerve root blocks geared to those dermatomal levels only worked about 2 months whereas in the past they were longer than that.  This study does reveal degenerative changes, probably not a lot different from his prior study from a year and a half ago.  There is some possible impingement of left S1 nerve root.  Discussed at length.  I think we could try a translaminar lumbar KIRBY instead of the nerve root blocks.  I reviewed all the records that have available and do not see a mention of that type of injection he cannot recall having one of those either.  He is not on any type of strong antiplatelet or anticoagulant therapy.  We will schedule such injection at the L5-S1 level at first availability and go from there.  If that does not offer much more benefit then we will need to rediscuss other options.

## 2025-04-21 DIAGNOSIS — M54.17 LUMBOSACRAL RADICULOPATHY: Primary | ICD-10-CM

## 2025-05-01 ENCOUNTER — OFFICE VISIT (OUTPATIENT)
Dept: ORTHOPEDIC SURGERY | Age: 55
End: 2025-05-01
Payer: COMMERCIAL

## 2025-05-01 DIAGNOSIS — M54.17 LUMBOSACRAL RADICULOPATHY: Primary | ICD-10-CM

## 2025-05-01 DIAGNOSIS — M54.2 CERVICALGIA: ICD-10-CM

## 2025-05-01 DIAGNOSIS — M54.17 LUMBOSACRAL RADICULOPATHY: ICD-10-CM

## 2025-05-01 DIAGNOSIS — M79.10 MYALGIA: Primary | ICD-10-CM

## 2025-05-01 DIAGNOSIS — N28.1 RENAL CYST, RIGHT: Primary | ICD-10-CM

## 2025-05-01 PROCEDURE — 62323 NJX INTERLAMINAR LMBR/SAC: CPT | Performed by: PHYSICAL MEDICINE & REHABILITATION

## 2025-05-01 PROCEDURE — 20552 NJX 1/MLT TRIGGER POINT 1/2: CPT | Performed by: PHYSICAL MEDICINE & REHABILITATION

## 2025-05-01 RX ORDER — TRIAMCINOLONE ACETONIDE 40 MG/ML
40 INJECTION, SUSPENSION INTRA-ARTICULAR; INTRAMUSCULAR ONCE
Status: COMPLETED | OUTPATIENT
Start: 2025-05-01 | End: 2025-05-01

## 2025-05-01 RX ORDER — GABAPENTIN 100 MG/1
100 CAPSULE ORAL 3 TIMES DAILY
Qty: 270 CAPSULE | Refills: 1 | Status: SHIPPED | OUTPATIENT
Start: 2025-05-01 | End: 2025-10-28

## 2025-05-01 RX ADMIN — TRIAMCINOLONE ACETONIDE 40 MG: 40 INJECTION, SUSPENSION INTRA-ARTICULAR; INTRAMUSCULAR at 14:44

## 2025-05-01 NOTE — PROGRESS NOTES
Date: 05/01/25   Name: Jerrod Maldonado    Pre-Procedural Diagnosis:    Diagnosis Orders   1. Myalgia  INJECT TRIGGER POINT, 1 OR 2      2. Lumbosacral radiculopathy  XR INJ SPINE THER SUBST LUM/SAC W IMG    triamcinolone acetonide (KENALOG-40) injection 40 mg      3. Cervicalgia  INJECT TRIGGER POINT, 1 OR 2          Procedure: Lumbar Epidural Steroid Injection (KIRBY) with Trigger Point Injection(s)    I have personally reviewed with patient the informed consent for operation/procedure per [facility] protocol.  This involves risks and benefits of procedure, potential for success/improvement of injections,qualifications of physician performing procedure.  Consent form addressed appropriate local anesthesia.  This form was signed by all appropriate parties and scanned into the medical record. Note that is not appropriate for me to discuss spine surgical issues or other treatment options if I am not the primary clinician.  If I am the ordering clinician, those issues would have been discussed at the appropriate office visit or at upcoming encounter.     Precautions: [facility] Precautions spine injections: None.  Patient denies any prior sensitivity to steroid, local anesthetic, contrast dye, iodine or shellfish.    The procedure was discussed at length with the patient and informed consent was signed. The patient was placed in a prone position on the fluoroscopy table and the skin was prepped and draped in a routine sterile fashion. The areas to be injected were each anesthetized with approximately 5 cc of 1% Lidocaine. Initially a 22-gauge 5 inch spinal needle was carefully advanced under fluoroscopic guidance to the left L4-L5 interlaminar epidural space. At this time 0.25 cc of omnipaque administered.. Once proper placement was confirmed, 1.5 cc of sterile water and 100 mg of Kenalog were injected through the spinal needle.     After informed oral consent, patient was prepped per routine. The bilateral cervical

## 2025-05-14 ENCOUNTER — HOSPITAL ENCOUNTER (OUTPATIENT)
Dept: ULTRASOUND IMAGING | Age: 55
Discharge: HOME | End: 2025-05-16
Payer: COMMERCIAL

## 2025-05-14 DIAGNOSIS — N28.1 RENAL CYST, RIGHT: ICD-10-CM

## 2025-05-14 PROCEDURE — 76770 US EXAM ABDO BACK WALL COMP: CPT

## 2025-05-19 ENCOUNTER — TELEPHONE (OUTPATIENT)
Dept: ORTHOPEDIC SURGERY | Age: 55
End: 2025-05-19

## 2025-05-19 NOTE — TELEPHONE ENCOUNTER
Relayed ultrasound results to patient.  The area in question the right kidney is felt to be a cyst.  Everything else looks good with regards to aorta, etc.  No changes in plan.  No follow-up or other intervention required.

## 2025-05-22 ENCOUNTER — ANESTHESIA EVENT (OUTPATIENT)
Dept: SURGERY | Age: 55
End: 2025-05-22
Payer: COMMERCIAL

## 2025-05-22 RX ORDER — BUPRENORPHINE 15 UG/H
1 PATCH TRANSDERMAL WEEKLY
COMMUNITY
Start: 2025-04-05

## 2025-05-22 RX ORDER — DULOXETIN HYDROCHLORIDE 60 MG/1
60 CAPSULE, DELAYED RELEASE ORAL DAILY
COMMUNITY
Start: 2025-03-02

## 2025-05-22 RX ORDER — LABETALOL HYDROCHLORIDE 5 MG/ML
10 INJECTION, SOLUTION INTRAVENOUS
Status: CANCELLED | OUTPATIENT
Start: 2025-05-22

## 2025-05-22 RX ORDER — ATORVASTATIN CALCIUM 40 MG/1
40 TABLET, FILM COATED ORAL DAILY
COMMUNITY

## 2025-05-22 RX ORDER — TAMSULOSIN HYDROCHLORIDE 0.4 MG/1
0.4 CAPSULE ORAL DAILY
COMMUNITY

## 2025-05-22 RX ORDER — HALOPERIDOL 5 MG/ML
1 INJECTION INTRAMUSCULAR
Status: CANCELLED | OUTPATIENT
Start: 2025-05-22

## 2025-05-22 RX ORDER — PIOGLITAZONE 30 MG/1
30 TABLET ORAL DAILY
COMMUNITY

## 2025-05-22 RX ORDER — SEMAGLUTIDE 0.68 MG/ML
0.5 INJECTION, SOLUTION SUBCUTANEOUS WEEKLY
COMMUNITY
Start: 2025-03-18

## 2025-05-22 RX ORDER — OXYCODONE HYDROCHLORIDE 5 MG/1
5 TABLET ORAL
Refills: 0 | Status: CANCELLED | OUTPATIENT
Start: 2025-05-22

## 2025-05-22 RX ORDER — SILDENAFIL 100 MG/1
100 TABLET, FILM COATED ORAL DAILY PRN
COMMUNITY
Start: 2025-05-06 | End: 2025-06-05

## 2025-05-22 RX ORDER — ESOMEPRAZOLE MAGNESIUM 40 MG/1
40 CAPSULE, DELAYED RELEASE ORAL
COMMUNITY

## 2025-05-22 RX ORDER — PROCHLORPERAZINE EDISYLATE 5 MG/ML
5 INJECTION INTRAMUSCULAR; INTRAVENOUS
Status: CANCELLED | OUTPATIENT
Start: 2025-05-22

## 2025-05-22 RX ORDER — AMLODIPINE AND BENAZEPRIL HYDROCHLORIDE 5; 20 MG/1; MG/1
1 CAPSULE ORAL DAILY
COMMUNITY

## 2025-05-22 RX ORDER — NALOXONE HYDROCHLORIDE 0.4 MG/ML
INJECTION, SOLUTION INTRAMUSCULAR; INTRAVENOUS; SUBCUTANEOUS PRN
Status: CANCELLED | OUTPATIENT
Start: 2025-05-22

## 2025-05-22 RX ORDER — GLIPIZIDE 10 MG/1
10 TABLET ORAL
COMMUNITY

## 2025-05-22 RX ORDER — IPRATROPIUM BROMIDE AND ALBUTEROL SULFATE 2.5; .5 MG/3ML; MG/3ML
1 SOLUTION RESPIRATORY (INHALATION)
Status: CANCELLED | OUTPATIENT
Start: 2025-05-22

## 2025-05-22 NOTE — PERIOP NOTE
Patient verified name and .  Order for consent  was not found in EHR and does not match case posting; patient verifies procedure.   Type 1B surgery, phone assessment complete.  Orders not received.  Labs per surgeon: none  Labs per anesthesia protocol: POC Glucose DOS     Patient answered medical/surgical history questions at their best of ability. All prior to admission medications documented in EPIC.    Patient instructed to continue taking all prescription medications up to the day of surgery but to take only the following medications the day of surgery according to anesthesia guidelines with a small sip of water: Amlodipine -benazepril (Lotrel), Tamsulosin (Flomax), pravastatin (pravachol), Esomeprazole (Nexium), Metformin (Glucophage). Atorvastatin (Lipitor), Duloxetine (Cymbalta) , gabapentin (Neurontin), Buprenex PTWK if needed Also, patient is requested to take 2 Tylenol in the morning and then again before bed on the day before surgery. Regular or extra strength may be used.       Patient informed that all vitamins and supplements should be held 7 days prior to surgery and NSAIDS 5 days prior to surgery. Prescription meds to hold:pioglitazone (ACTOS), Glipizide (Glucotrol), Diclofenac - misoprostol (Arthrotec) stop now until after surgery, Sildenafil (Viagra) hold 24 hours PTS    Patient instructed on the following:    > Patient is instructed to have clear liquids only on the day prior to procedure and up to 4 hours prior to hospital arrival, unless otherwise indicated, including gum, mints, and ice chips.   CLEAR LIQUID DIET    Things included in a clear liquid diet:     Water  Black Coffee (may have sugar but no milk or cream)  Tea  Any type soft drink  Apple, Cranberry, or Grape juice  Chicken bouillon or broth  Beef bouillon or broth  Popsicles  Jell-O (any flavor), NO solid fruit mixed in  Hard candy that is clear, such as lifesavers or lemon drops    Things NOT included in clear liquid:     Milk  and milk products  Milkshakes  Any solid food  Any solid soup with solid ingredients such as noodles  Any creamed soup  Gum or Mints  Orange juice (or any other juice containing pulp)     > Arrive at OPC Entrance, time of arrival to be called the day before by 1700  > No food after midnight, patient may drink clear liquids up until 4 hours prior to arrival. No gum, candy, mints.   > Responsible adult must drive patient to the hospital, stay during surgery, and patient will need supervision 24 hours after anesthesia  > Use non moisturizing soap in shower the night before surgery and on the morning of surgery  > All piercings must be removed prior to arrival.    > Leave all valuables (money and jewelry) at home but bring insurance card and ID on DOS.   > You may be required to pay a deductible or co-pay on the day of your procedure. You can pre-pay by calling [phone] if your surgery is at the [facility] or [phone] if your surgery is at the [facility].  > Do not wear make-up, nail polish, lotions, cologne, perfumes, powders, or oil on skin. Artificial nails are not permitted.

## 2025-05-22 NOTE — PRE-PROCEDURE INSTRUCTIONS
Preop department called to notify patient of arrival time for scheduled procedure. Instructions given to   - Arrive at OPC Entrance [address].  - Nothing to eat after midnight unless otherwise indicated. No gum, mints, or ice chips.  - Have a responsible adult to drive patient to the hospital, stay during surgery, and patient will need supervision 24 hours after anesthesia.   - Use antibacterial soap in shower the night before surgery and on the morning of surgery.       Was patient contacted: no  Voicemail left: yes  Numbers contacted: [phone]   Arrival time: 1000

## 2025-05-23 ENCOUNTER — HOSPITAL ENCOUNTER (OUTPATIENT)
Age: 55
Setting detail: OUTPATIENT SURGERY
Discharge: HOME | End: 2025-05-23
Attending: STUDENT IN AN ORGANIZED HEALTH CARE EDUCATION/TRAINING PROGRAM | Admitting: STUDENT IN AN ORGANIZED HEALTH CARE EDUCATION/TRAINING PROGRAM
Payer: COMMERCIAL

## 2025-05-23 ENCOUNTER — ANESTHESIA (OUTPATIENT)
Dept: SURGERY | Age: 55
End: 2025-05-23
Payer: COMMERCIAL

## 2025-05-23 VITALS
OXYGEN SATURATION: 95 % | HEIGHT: 72 IN | HEART RATE: 70 BPM | RESPIRATION RATE: 16 BRPM | SYSTOLIC BLOOD PRESSURE: 133 MMHG | TEMPERATURE: 98 F | DIASTOLIC BLOOD PRESSURE: 77 MMHG | BODY MASS INDEX: 30.48 KG/M2 | WEIGHT: 225 LBS

## 2025-05-23 LAB
GLUCOSE BLD STRIP.AUTO-MCNC: 128 MG/DL (ref 65–100)
SERVICE CMNT-IMP: ABNORMAL

## 2025-05-23 PROCEDURE — 2720000010 HC SURG SUPPLY STERILE: Performed by: STUDENT IN AN ORGANIZED HEALTH CARE EDUCATION/TRAINING PROGRAM

## 2025-05-23 PROCEDURE — 3700000000 HC ANESTHESIA ATTENDED CARE: Performed by: STUDENT IN AN ORGANIZED HEALTH CARE EDUCATION/TRAINING PROGRAM

## 2025-05-23 PROCEDURE — 3700000001 HC ADD 15 MINUTES (ANESTHESIA): Performed by: STUDENT IN AN ORGANIZED HEALTH CARE EDUCATION/TRAINING PROGRAM

## 2025-05-23 PROCEDURE — 82962 GLUCOSE BLOOD TEST: CPT

## 2025-05-23 PROCEDURE — 6360000002 HC RX W HCPCS

## 2025-05-23 PROCEDURE — 6370000000 HC RX 637 (ALT 250 FOR IP): Performed by: STUDENT IN AN ORGANIZED HEALTH CARE EDUCATION/TRAINING PROGRAM

## 2025-05-23 PROCEDURE — 7100000001 HC PACU RECOVERY - ADDTL 15 MIN: Performed by: STUDENT IN AN ORGANIZED HEALTH CARE EDUCATION/TRAINING PROGRAM

## 2025-05-23 PROCEDURE — 2580000003 HC RX 258: Performed by: STUDENT IN AN ORGANIZED HEALTH CARE EDUCATION/TRAINING PROGRAM

## 2025-05-23 PROCEDURE — 7100000000 HC PACU RECOVERY - FIRST 15 MIN: Performed by: STUDENT IN AN ORGANIZED HEALTH CARE EDUCATION/TRAINING PROGRAM

## 2025-05-23 PROCEDURE — 2500000003 HC RX 250 WO HCPCS: Performed by: STUDENT IN AN ORGANIZED HEALTH CARE EDUCATION/TRAINING PROGRAM

## 2025-05-23 PROCEDURE — 7100000011 HC PHASE II RECOVERY - ADDTL 15 MIN: Performed by: STUDENT IN AN ORGANIZED HEALTH CARE EDUCATION/TRAINING PROGRAM

## 2025-05-23 PROCEDURE — 7100000010 HC PHASE II RECOVERY - FIRST 15 MIN: Performed by: STUDENT IN AN ORGANIZED HEALTH CARE EDUCATION/TRAINING PROGRAM

## 2025-05-23 PROCEDURE — 3600000003 HC SURGERY LEVEL 3 BASE: Performed by: STUDENT IN AN ORGANIZED HEALTH CARE EDUCATION/TRAINING PROGRAM

## 2025-05-23 PROCEDURE — 2709999900 HC NON-CHARGEABLE SUPPLY: Performed by: STUDENT IN AN ORGANIZED HEALTH CARE EDUCATION/TRAINING PROGRAM

## 2025-05-23 PROCEDURE — 2580000003 HC RX 258: Performed by: SURGERY

## 2025-05-23 PROCEDURE — 3600000013 HC SURGERY LEVEL 3 ADDTL 15MIN: Performed by: STUDENT IN AN ORGANIZED HEALTH CARE EDUCATION/TRAINING PROGRAM

## 2025-05-23 PROCEDURE — 6360000002 HC RX W HCPCS: Performed by: STUDENT IN AN ORGANIZED HEALTH CARE EDUCATION/TRAINING PROGRAM

## 2025-05-23 RX ORDER — PHENYLEPHRINE HYDROCHLORIDE 25 MG/ML
1 SOLUTION/ DROPS OPHTHALMIC
Status: COMPLETED | OUTPATIENT
Start: 2025-05-23 | End: 2025-05-23

## 2025-05-23 RX ORDER — LIDOCAINE HYDROCHLORIDE 10 MG/ML
1 INJECTION, SOLUTION INFILTRATION; PERINEURAL
Status: DISCONTINUED | OUTPATIENT
Start: 2025-05-23 | End: 2025-05-23 | Stop reason: HOSPADM

## 2025-05-23 RX ORDER — PROPOFOL 10 MG/ML
INJECTION, EMULSION INTRAVENOUS
Status: DISCONTINUED | OUTPATIENT
Start: 2025-05-23 | End: 2025-05-23 | Stop reason: SDUPTHER

## 2025-05-23 RX ORDER — TRIAMCINOLONE ACETONIDE 40 MG/ML
INJECTION, SUSPENSION INTRA-ARTICULAR; INTRAMUSCULAR PRN
Status: DISCONTINUED | OUTPATIENT
Start: 2025-05-23 | End: 2025-05-23 | Stop reason: ALTCHOICE

## 2025-05-23 RX ORDER — TETRACAINE HYDROCHLORIDE 5 MG/ML
SOLUTION OPHTHALMIC PRN
Status: DISCONTINUED | OUTPATIENT
Start: 2025-05-23 | End: 2025-05-23 | Stop reason: ALTCHOICE

## 2025-05-23 RX ORDER — SODIUM CHLORIDE 9 MG/ML
INJECTION, SOLUTION INTRAVENOUS PRN
Status: DISCONTINUED | OUTPATIENT
Start: 2025-05-23 | End: 2025-05-23 | Stop reason: HOSPADM

## 2025-05-23 RX ORDER — LIDOCAINE HYDROCHLORIDE 20 MG/ML
INJECTION, SOLUTION EPIDURAL; INFILTRATION; INTRACAUDAL; PERINEURAL PRN
Status: DISCONTINUED | OUTPATIENT
Start: 2025-05-23 | End: 2025-05-23 | Stop reason: ALTCHOICE

## 2025-05-23 RX ORDER — SODIUM CHLORIDE, POTASSIUM CHLORIDE, CALCIUM CHLORIDE, MAGNESIUM CHLORIDE, SODIUM ACETATE, AND SODIUM CITRATE 6.4; .75; .48; .3; 3.9; 1.7 MG/ML; MG/ML; MG/ML; MG/ML; MG/ML; MG/ML
SOLUTION IRRIGATION PRN
Status: DISCONTINUED | OUTPATIENT
Start: 2025-05-23 | End: 2025-05-23 | Stop reason: ALTCHOICE

## 2025-05-23 RX ORDER — ATROPINE SULFATE 10 MG/ML
1 SOLUTION/ DROPS OPHTHALMIC
Status: COMPLETED | OUTPATIENT
Start: 2025-05-23 | End: 2025-05-23

## 2025-05-23 RX ORDER — SODIUM CHLORIDE, SODIUM LACTATE, POTASSIUM CHLORIDE, CALCIUM CHLORIDE 600; 310; 30; 20 MG/100ML; MG/100ML; MG/100ML; MG/100ML
INJECTION, SOLUTION INTRAVENOUS CONTINUOUS
Status: DISCONTINUED | OUTPATIENT
Start: 2025-05-23 | End: 2025-05-23 | Stop reason: HOSPADM

## 2025-05-23 RX ORDER — BETAMETHASONE SODIUM PHOSPHATE AND BETAMETHASONE ACETATE 3; 3 MG/ML; MG/ML
INJECTION, SUSPENSION INTRA-ARTICULAR; INTRALESIONAL; INTRAMUSCULAR; SOFT TISSUE PRN
Status: DISCONTINUED | OUTPATIENT
Start: 2025-05-23 | End: 2025-05-23 | Stop reason: ALTCHOICE

## 2025-05-23 RX ORDER — SODIUM CHLORIDE 0.9 % (FLUSH) 0.9 %
5-40 SYRINGE (ML) INJECTION EVERY 12 HOURS SCHEDULED
Status: DISCONTINUED | OUTPATIENT
Start: 2025-05-23 | End: 2025-05-23 | Stop reason: HOSPADM

## 2025-05-23 RX ORDER — DEXTROSE MONOHYDRATE 100 MG/ML
INJECTION, SOLUTION INTRAVENOUS CONTINUOUS PRN
Status: COMPLETED | OUTPATIENT
Start: 2025-05-23 | End: 2025-05-23

## 2025-05-23 RX ORDER — SODIUM CHLORIDE 0.9 % (FLUSH) 0.9 %
5-40 SYRINGE (ML) INJECTION PRN
Status: DISCONTINUED | OUTPATIENT
Start: 2025-05-23 | End: 2025-05-23 | Stop reason: HOSPADM

## 2025-05-23 RX ORDER — TROPICAMIDE 10 MG/ML
1 SOLUTION/ DROPS OPHTHALMIC
Status: COMPLETED | OUTPATIENT
Start: 2025-05-23 | End: 2025-05-23

## 2025-05-23 RX ORDER — NEOMYCIN SULFATE, POLYMYXIN B SULFATE, AND DEXAMETHASONE 3.5; 10000; 1 MG/G; [USP'U]/G; MG/G
OINTMENT OPHTHALMIC PRN
Status: DISCONTINUED | OUTPATIENT
Start: 2025-05-23 | End: 2025-05-23 | Stop reason: ALTCHOICE

## 2025-05-23 RX ORDER — BUPIVACAINE HYDROCHLORIDE 7.5 MG/ML
INJECTION, SOLUTION EPIDURAL; RETROBULBAR PRN
Status: DISCONTINUED | OUTPATIENT
Start: 2025-05-23 | End: 2025-05-23 | Stop reason: ALTCHOICE

## 2025-05-23 RX ORDER — CEFAZOLIN SODIUM 1 G/3ML
INJECTION, POWDER, FOR SOLUTION INTRAMUSCULAR; INTRAVENOUS PRN
Status: DISCONTINUED | OUTPATIENT
Start: 2025-05-23 | End: 2025-05-23 | Stop reason: ALTCHOICE

## 2025-05-23 RX ADMIN — TROPICAMIDE 1 DROP: 10 SOLUTION/ DROPS OPHTHALMIC at 10:17

## 2025-05-23 RX ADMIN — PHENYLEPHRINE HYDROCHLORIDE 1 DROP: 25 SOLUTION/ DROPS OPHTHALMIC at 10:22

## 2025-05-23 RX ADMIN — FENTANYL CITRATE 50 MCG: 50 INJECTION INTRAMUSCULAR; INTRAVENOUS at 13:12

## 2025-05-23 RX ADMIN — PHENYLEPHRINE HYDROCHLORIDE 1 DROP: 25 SOLUTION/ DROPS OPHTHALMIC at 10:17

## 2025-05-23 RX ADMIN — SODIUM CHLORIDE, POTASSIUM CHLORIDE, SODIUM LACTATE AND CALCIUM CHLORIDE: 600; 310; 30; 20 INJECTION, SOLUTION INTRAVENOUS at 10:28

## 2025-05-23 RX ADMIN — ATROPINE SULFATE 1 DROP: 10 SOLUTION/ DROPS OPHTHALMIC at 10:17

## 2025-05-23 RX ADMIN — TROPICAMIDE 1 DROP: 10 SOLUTION/ DROPS OPHTHALMIC at 10:22

## 2025-05-23 RX ADMIN — ATROPINE SULFATE 1 DROP: 10 SOLUTION/ DROPS OPHTHALMIC at 10:22

## 2025-05-23 RX ADMIN — PROPOFOL 60 MG: 10 INJECTION, EMULSION INTRAVENOUS at 12:56

## 2025-05-23 RX ADMIN — PHENYLEPHRINE HYDROCHLORIDE 1 DROP: 25 SOLUTION/ DROPS OPHTHALMIC at 10:27

## 2025-05-23 RX ADMIN — TROPICAMIDE 1 DROP: 10 SOLUTION/ DROPS OPHTHALMIC at 10:27

## 2025-05-23 ASSESSMENT — PAIN - FUNCTIONAL ASSESSMENT: PAIN_FUNCTIONAL_ASSESSMENT: 0-10

## 2025-05-23 NOTE — ANESTHESIA POSTPROCEDURE EVALUATION
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    Hans Phase I: Hans Score: 10    Hans Phase II:      Anesthesia Post Evaluation    Patient location during evaluation: PACU  Patient participation: complete - patient participated  Level of consciousness: awake and alert  Airway patency: patent  Nausea & Vomiting: no nausea and no vomiting  Cardiovascular status: hemodynamically stable  Respiratory status: acceptable  Hydration status: euvolemic  Comments: Blood pressure 130/74, pulse 81, temperature 98 °F (36.7 °C), resp. rate 12, height 1.829 m (6'), weight 102.1 kg (225 lb), SpO2 94%.    No apparent anesthetic complications.  Pt stable for discharge from PACU  Multimodal analgesia pain management approach  Pain management: adequate    No notable events documented.

## 2025-05-23 NOTE — OP NOTE
Operative Note      Patient: Jerrod Maldonado  YOB: 1970  MRN: [id number]    Date of Procedure: 5/23/2025    Pre-Op Diagnosis Codes:      * Retinal detachment of left eye with giant retinal tear [H33.032]    Post-Op Diagnosis: Same       Procedure(s):  LEFT EYE PARS PLANA VITRECTOMY, 25 GAUGE, LASER, GAS FLUID EXCHANGE (C3F8)    Surgeon(s):  Saad Solis MD    Assistant:   * No surgical staff found *    Anesthesia: Monitor Anesthesia Care    Estimated Blood Loss (mL): Minimal    Complications: None    Specimens:   * No specimens in log *    Implants:  * No implants in log *      Drains: * No LDAs found *    Findings:  Infection Present At Time Of Surgery (PATOS) (choose all levels that have infection present):  No infection present  Other Findings: n/a    Detailed Description of Procedure:   The patient was met in the preop holding area. Questions were answered and consent was obtained. The LEFT eye was confirmed and marked as the correct operative eye. The patient was brought to the OR suite. Timeout was performed. Anesthesia was induced and retrobulbar block performed.  The eye was prepped and draped in the usual sterile ophthalmic fashion.      Subtenon's block was performed inferonasally. 3 25G trochars were inserted inferotemporal, superotemporal, and superonasal 4.0 mm from the limbus. The infusion was visually confirmed to be within the vitreous cavity prior to opening. Pars plana vitrectomy was performed, as well as peripheral shave with scleral depression assisted with Kenolog highlighting. The retina was visualized and found to have SRF 11 to 4 oclock.  Retinal tear was discovered at 2 oclock and marked with diathermy.  Additional small tear at 630 oclock was seen in lattice degeneration, not involved in the detachment. PVD was confirmed to be present. A drainage retinotomy was made at 2 oclock mid periphery. Air fluid exchange was performed draining subretinal fluid through the

## 2025-05-23 NOTE — ANESTHESIA PRE PROCEDURE
Answered      Vital Signs (Current):   Vitals:    05/22/25 0850 05/23/25 1023   BP:  139/78   Pulse:  74   Resp:  16   Temp:  98.2 °F (36.8 °C)   TempSrc:  Oral   SpO2:  95%   Weight: 102.1 kg (225 lb)    Height: 1.829 m (6')                                               BP Readings from Last 3 Encounters:   05/23/25 139/78       NPO Status: Time of last liquid consumption: 2130                        Time of last solid consumption: 2030                        Date of last liquid consumption: 05/22/25                        Date of last solid food consumption: 05/22/25    BMI:   Wt Readings from Last 3 Encounters:   05/22/25 102.1 kg (225 lb)   04/11/25 97.5 kg (215 lb)     Body mass index is 30.52 kg/m².    CBC: No results found for: \"WBC\", \"RBC\", \"HGB\", \"HCT\", \"MCV\", \"RDW\", \"PLT\"    CMP: No results found for: \"NA\", \"K\", \"CL\", \"CO2\", \"BUN\", \"CREATININE\", \"GFRAA\", \"AGRATIO\", \"LABGLOM\", \"GLUCOSE\", \"GLU\", \"CALCIUM\", \"BILITOT\", \"ALKPHOS\", \"AST\", \"ALT\"    POC Tests: No results for input(s): \"POCGLU\", \"POCNA\", \"POCK\", \"POCCL\", \"POCBUN\", \"POCHEMO\", \"POCHCT\" in the last 72 hours.    Coags: No results found for: \"PROTIME\", \"INR\", \"APTT\"    HCG (If Applicable): No results found for: \"PREGTESTUR\", \"PREGSERUM\", \"HCG\", \"HCGQUANT\"     ABGs: No results found for: \"PHART\", \"PO2ART\", \"RJH7KGK\", \"AFN1IWS\", \"BEART\", \"Y6VPUOVZ\"     Type & Screen (If Applicable):  No results found for: \"ABORH\", \"LABANTI\"    Drug/Infectious Status (If Applicable):  No results found for: \"HIV\", \"HEPCAB\"    COVID-19 Screening (If Applicable): No results found for: \"COVID19\"        Anesthesia Evaluation  Patient summary reviewed and Nursing notes reviewed   history of anesthetic complications: PONV.  Airway: Mallampati: II  TM distance: >3 FB   Neck ROM: full  Mouth opening: > = 3 FB   Dental: normal exam         Pulmonary:Negative Pulmonary ROS breath sounds clear to auscultation                             Cardiovascular:  Exercise tolerance: good (>4

## 2025-05-23 NOTE — PROGRESS NOTES
SPIRITUAL HEALTH   Note for Initial Spiritual Assessment                   Room # OPOR/PL    Name: Jerrod Maldonado           Age: 54 y.o.    Gender: male          MRN: [id number]  Orthodoxy: Christianity       Preferred Language: English    Date: 05/23/25  Visit Time: Begin Time: 1050 End Time : 1110 Complexity of Encounter: Low      Visit Summary: Volunteer Maryam Lebron responded to pre-op prayer request. Volunteer prayed with the patient and his spouse before surgery.  is available by referral for emergent needs.    Referral/Consult From: Other   Encounter Overview/Reason: Volunteer Encounter  Encounter Code:     Crisis (if applicable):    Service Provided For: Patient and family together     Patient was available.    Park, Belief, Meaning:   Patient identifies as spiritual  is connected with a park tradition or spiritual practice  Family/Friends identifies as spiritual  are connected with a park tradition or spiritual practice  Rituals (if applicable)      Importance and Influence:  Patient does not have spiritual/personal beliefs that influence decisions regarding their health  Family/Friends does not have spiritual/personal beliefs that influence decisions regarding the patient's health    Community:  Patient   is connected with a spiritual community  indicated that they feel well-supported  Family/Friends   is connected with a spiritual community  indicated that they feel well-supported    Assessment and Plan of Care:   Emotions Expressed by Patient:   Assessment: Hopeful, Peaceful    Interventions by :   Intervention: Active listening, Discussed belief system/Episcopal practices/park, Discussed meaning/purpose, Prayer (assurance of)/[place]     Result/ Response by Patient:   Outcome: Acceptance, Comfort, Peace    Patient Plan of Care:         Emotions Expressed by Spouse/Family/Friends:   hopeful  peaceful  gratitude     Interventions with Spouse/ Family/Friends include:

## 2025-05-27 DIAGNOSIS — M54.17 LUMBOSACRAL RADICULOPATHY: Primary | ICD-10-CM

## 2025-05-27 RX ORDER — BUPRENORPHINE 15 UG/H
1 PATCH TRANSDERMAL WEEKLY
Qty: 4 PATCH | Refills: 4 | Status: SHIPPED | OUTPATIENT
Start: 2025-05-27 | End: 2025-10-14

## 2025-07-15 DIAGNOSIS — M54.17 LUMBOSACRAL RADICULOPATHY: Primary | ICD-10-CM

## 2025-07-18 ENCOUNTER — OFFICE VISIT (OUTPATIENT)
Dept: ORTHOPEDIC SURGERY | Age: 55
End: 2025-07-18

## 2025-07-18 DIAGNOSIS — M54.2 CERVICALGIA: ICD-10-CM

## 2025-07-18 DIAGNOSIS — M54.17 LUMBOSACRAL RADICULOPATHY: Primary | ICD-10-CM

## 2025-07-18 DIAGNOSIS — M79.10 MYALGIA: ICD-10-CM

## 2025-07-18 RX ORDER — TRIAMCINOLONE ACETONIDE 40 MG/ML
40 INJECTION, SUSPENSION INTRA-ARTICULAR; INTRAMUSCULAR ONCE
Status: COMPLETED | OUTPATIENT
Start: 2025-07-18 | End: 2025-07-18

## 2025-07-18 RX ADMIN — TRIAMCINOLONE ACETONIDE 40 MG: 40 INJECTION, SUSPENSION INTRA-ARTICULAR; INTRAMUSCULAR at 10:49

## 2025-07-18 NOTE — PROGRESS NOTES
Date: 07/18/25   Name: Jerrod Maldonado    Pre-Procedural Diagnosis:    Diagnosis Orders   1. Lumbosacral radiculopathy  INJECT TRIGGER POINT, 1 OR 2    XR INJ SPINE THER SUBST LUM/SAC W IMG    triamcinolone acetonide (KENALOG-40) injection 40 mg      2. Cervicalgia  INJECT TRIGGER POINT, 1 OR 2    XR INJ SPINE THER SUBST LUM/SAC W IMG    triamcinolone acetonide (KENALOG-40) injection 40 mg      3. Myalgia  INJECT TRIGGER POINT, 1 OR 2    XR INJ SPINE THER SUBST LUM/SAC W IMG    triamcinolone acetonide (KENALOG-40) injection 40 mg          Procedure: Lumbar Epidural Steroid Injection (KIRBY) with Trigger Point Injection(s)    I have personally reviewed with patient the informed consent for operation/procedure per [facility] protocol.  This involves risks and benefits of procedure, potential for success/improvement of injections,qualifications of physician performing procedure.  Consent form addressed appropriate local anesthesia.  This form was signed by all appropriate parties and scanned into the medical record. Note that is not appropriate for me to discuss spine surgical issues or other treatment options if I am not the primary clinician.  If I am the ordering clinician, those issues would have been discussed at the appropriate office visit or at upcoming encounter.     Precautions: [facility] Precautions spine injections: None.  Patient denies any prior sensitivity to steroid, local anesthetic, contrast dye, iodine or shellfish.    The procedure was discussed at length with the patient and informed consent was signed. The patient was placed in a prone position on the fluoroscopy table and the skin was prepped and draped in a routine sterile fashion. The areas to be injected were each anesthetized with approximately 5 cc of 1% Lidocaine. Initially a 22-gauge 5 inch spinal needle was carefully advanced under fluoroscopic guidance to the left L4-L5 interlaminar epidural space. At this time 0.25 cc of omnipaque

## 2025-07-21 NOTE — TELEPHONE ENCOUNTER
Appointment Request  (Newest Message First)  Jerrod Maldondao \"Geovanny\"  [facility]  Staff 6 hours ago (8:38 AM)     CW  Appointment Request From: Jerrod Maldonado     With Provider: MEENA MAJANO JR, MD [[facility]]     Preferred Date Range: 11/30/2023 - 12/14/2023     Preferred Times: Any Time     Reason for visit: Request an Appointment     Comments:  I need an appt for an injection on my back.   Detail Level: Zone

## (undated) DEVICE — 25 GA ILLUMINATED FLEXIBLE CURVED LASER PROBE: Brand: ALCON, ENGAUGE

## (undated) DEVICE — SYRINGE MED 50ML LUERLOCK TIP

## (undated) DEVICE — Device: Brand: MILLEX-OR

## (undated) DEVICE — MICROSURGICAL INSTRUMENT 25GA SOFT TIP CANNULA, DSP, 0.8MM: Brand: ALCON

## (undated) DEVICE — STRIP,CLOSURE,WOUND,MEDI-STRIP,1/2X4: Brand: MEDLINE

## (undated) DEVICE — DRAPE,INCISE,EYE,POUCH,INVISISHIELD: Brand: MEDLINE

## (undated) DEVICE — SYRINGE MEDICAL 3ML CLEAR PLASTIC STANDARD NON CONTROL LUERLOCK TIP DISPOSABLE

## (undated) DEVICE — DISPOSABLE BIPOLAR CODE, 12' (3.66 M): Brand: CONMED

## (undated) DEVICE — NEEDLE OPHTH ATKNSN 25 GAX38 MM RETROBULBAR

## (undated) DEVICE — 3M™ STERI-STRIP™ REINFORCED ADHESIVE SKIN CLOSURES, R1547, 1/2 IN X 4 IN (12 MM X 100 MM), 6 STRIPS/ENVELOPE: Brand: 3M™ STERI-STRIP™

## (undated) DEVICE — BETADINE 5% EYE SOL

## (undated) DEVICE — PAD,EYE,1-5/8X2 5/8,STERILE,LF,1/PK: Brand: MEDLINE

## (undated) DEVICE — SOLUTION IRRIG 1000ML H2O PIC PLAS SHATTERPROOF CONTAINER

## (undated) DEVICE — SUTURE PERMAHAND SZ 2-0 L12X18IN NONABSORBABLE BLK SILK A185H

## (undated) DEVICE — SOLUTION IRRIGATION BAL SALT SOLUTION BSS +

## (undated) DEVICE — GLOVE SURG SZ 75 CRM LTX FREE POLYISOPRENE POLYMER BEAD ANTI

## (undated) DEVICE — 3M™ STERI-DRAPE™ MEDIUM DRAPE WITH INCISE FILM AND POUCH 1061: Brand: STERI-DRAPE™

## (undated) DEVICE — BASIC SINGLE BASIN 2-LF: Brand: MEDLINE INDUSTRIES, INC.

## (undated) DEVICE — DIATHERMY PROBE DSP, 25G: Brand: ALCON GRIESHABER

## (undated) DEVICE — EYE PACK

## (undated) DEVICE — SYRINGE MED 1ML POLYCARB LUERLOCK HUB